# Patient Record
Sex: MALE
[De-identification: names, ages, dates, MRNs, and addresses within clinical notes are randomized per-mention and may not be internally consistent; named-entity substitution may affect disease eponyms.]

---

## 2018-07-30 PROBLEM — Z00.00 ENCOUNTER FOR PREVENTIVE HEALTH EXAMINATION: Status: ACTIVE | Noted: 2018-07-30

## 2018-08-13 ENCOUNTER — APPOINTMENT (OUTPATIENT)
Dept: BARIATRICS | Facility: CLINIC | Age: 43
End: 2018-08-13
Payer: COMMERCIAL

## 2018-08-13 VITALS
DIASTOLIC BLOOD PRESSURE: 82 MMHG | HEIGHT: 68 IN | WEIGHT: 315 LBS | HEART RATE: 91 BPM | SYSTOLIC BLOOD PRESSURE: 136 MMHG | OXYGEN SATURATION: 91 % | BODY MASS INDEX: 47.74 KG/M2

## 2018-08-13 DIAGNOSIS — Z83.49 FAMILY HISTORY OF OTHER ENDOCRINE, NUTRITIONAL AND METABOLIC DISEASES: ICD-10-CM

## 2018-08-13 DIAGNOSIS — Z83.3 FAMILY HISTORY OF DIABETES MELLITUS: ICD-10-CM

## 2018-08-13 DIAGNOSIS — Z01.818 ENCOUNTER FOR OTHER PREPROCEDURAL EXAMINATION: ICD-10-CM

## 2018-08-13 DIAGNOSIS — Z86.79 PERSONAL HISTORY OF OTHER DISEASES OF THE CIRCULATORY SYSTEM: ICD-10-CM

## 2018-08-13 PROCEDURE — 99244 OFF/OP CNSLTJ NEW/EST MOD 40: CPT

## 2018-08-14 PROBLEM — Z83.3 FAMILY HISTORY OF DIABETES MELLITUS: Status: ACTIVE | Noted: 2018-08-13

## 2018-08-14 PROBLEM — Z83.49 FAMILY HISTORY OF OBESITY: Status: ACTIVE | Noted: 2018-08-13

## 2018-08-14 PROBLEM — Z86.79 HISTORY OF HYPERTENSION: Status: RESOLVED | Noted: 2018-08-13 | Resolved: 2018-08-14

## 2018-08-14 RX ORDER — CETIRIZINE HYDROCHLORIDE 10 MG/1
10 TABLET, FILM COATED ORAL
Refills: 0 | Status: ACTIVE | COMMUNITY

## 2018-08-14 RX ORDER — MOMETASONE FUROATE AND FORMOTEROL FUMARATE DIHYDRATE 50; 5 UG/1; UG/1
AEROSOL RESPIRATORY (INHALATION)
Refills: 0 | Status: ACTIVE | COMMUNITY

## 2018-08-14 RX ORDER — UBIQUINOL 100 MG
CAPSULE ORAL
Refills: 0 | Status: ACTIVE | COMMUNITY

## 2018-08-14 RX ORDER — BACILLUS COAGULANS/INULIN 1B-250 MG
CAPSULE ORAL
Refills: 0 | Status: ACTIVE | COMMUNITY

## 2018-11-05 ENCOUNTER — APPOINTMENT (OUTPATIENT)
Dept: BARIATRICS | Facility: CLINIC | Age: 43
End: 2018-11-05
Payer: COMMERCIAL

## 2018-11-05 ENCOUNTER — APPOINTMENT (OUTPATIENT)
Dept: BARIATRICS/WEIGHT MGMT | Facility: CLINIC | Age: 43
End: 2018-11-05
Payer: COMMERCIAL

## 2018-11-05 VITALS
BODY MASS INDEX: 47.74 KG/M2 | SYSTOLIC BLOOD PRESSURE: 130 MMHG | HEART RATE: 89 BPM | WEIGHT: 315 LBS | OXYGEN SATURATION: 94 % | HEIGHT: 68 IN | DIASTOLIC BLOOD PRESSURE: 80 MMHG

## 2018-11-05 VITALS — WEIGHT: 315 LBS | HEIGHT: 68 IN | BODY MASS INDEX: 47.74 KG/M2

## 2018-11-05 DIAGNOSIS — Z78.9 OTHER SPECIFIED HEALTH STATUS: ICD-10-CM

## 2018-11-05 DIAGNOSIS — J44.9 CHRONIC OBSTRUCTIVE PULMONARY DISEASE, UNSPECIFIED: ICD-10-CM

## 2018-11-05 DIAGNOSIS — F32.9 MAJOR DEPRESSIVE DISORDER, SINGLE EPISODE, UNSPECIFIED: ICD-10-CM

## 2018-11-05 DIAGNOSIS — R63.5 ABNORMAL WEIGHT GAIN: ICD-10-CM

## 2018-11-05 PROCEDURE — 90791 PSYCH DIAGNOSTIC EVALUATION: CPT

## 2018-11-05 PROCEDURE — 97802 MEDICAL NUTRITION INDIV IN: CPT

## 2018-11-05 PROCEDURE — 99214 OFFICE O/P EST MOD 30 MIN: CPT

## 2018-11-06 PROBLEM — R63.5 WEIGHT GAIN: Status: ACTIVE | Noted: 2018-08-13

## 2019-01-03 ENCOUNTER — APPOINTMENT (OUTPATIENT)
Dept: BARIATRICS | Facility: CLINIC | Age: 44
End: 2019-01-03
Payer: COMMERCIAL

## 2019-01-03 ENCOUNTER — APPOINTMENT (OUTPATIENT)
Dept: BARIATRICS/WEIGHT MGMT | Facility: CLINIC | Age: 44
End: 2019-01-03
Payer: COMMERCIAL

## 2019-01-03 VITALS
HEART RATE: 84 BPM | DIASTOLIC BLOOD PRESSURE: 80 MMHG | SYSTOLIC BLOOD PRESSURE: 124 MMHG | BODY MASS INDEX: 47.74 KG/M2 | OXYGEN SATURATION: 96 % | WEIGHT: 315 LBS | HEIGHT: 68 IN

## 2019-01-03 VITALS — WEIGHT: 315 LBS | BODY MASS INDEX: 58.19 KG/M2

## 2019-01-03 DIAGNOSIS — R73.03 PREDIABETES.: ICD-10-CM

## 2019-01-03 PROCEDURE — 99214 OFFICE O/P EST MOD 30 MIN: CPT

## 2019-01-03 PROCEDURE — 90834 PSYTX W PT 45 MINUTES: CPT

## 2019-01-30 ENCOUNTER — APPOINTMENT (OUTPATIENT)
Dept: BARIATRICS/WEIGHT MGMT | Facility: CLINIC | Age: 44
End: 2019-01-30
Payer: COMMERCIAL

## 2019-01-30 VITALS — HEIGHT: 68 IN | WEIGHT: 315 LBS | BODY MASS INDEX: 47.74 KG/M2

## 2019-01-30 PROCEDURE — 97803 MED NUTRITION INDIV SUBSEQ: CPT

## 2019-02-04 ENCOUNTER — APPOINTMENT (OUTPATIENT)
Dept: BARIATRICS/WEIGHT MGMT | Facility: CLINIC | Age: 44
End: 2019-02-04
Payer: COMMERCIAL

## 2019-02-04 ENCOUNTER — APPOINTMENT (OUTPATIENT)
Dept: BARIATRICS | Facility: CLINIC | Age: 44
End: 2019-02-04
Payer: COMMERCIAL

## 2019-02-04 VITALS
OXYGEN SATURATION: 94 % | WEIGHT: 315 LBS | SYSTOLIC BLOOD PRESSURE: 140 MMHG | BODY MASS INDEX: 47.74 KG/M2 | HEART RATE: 84 BPM | DIASTOLIC BLOOD PRESSURE: 100 MMHG | HEIGHT: 68 IN

## 2019-02-04 DIAGNOSIS — G47.33 OBSTRUCTIVE SLEEP APNEA (ADULT) (PEDIATRIC): ICD-10-CM

## 2019-02-04 PROCEDURE — 90832 PSYTX W PT 30 MINUTES: CPT

## 2019-02-04 PROCEDURE — 99214 OFFICE O/P EST MOD 30 MIN: CPT

## 2019-02-04 NOTE — REASON FOR VISIT
[Follow-Up Visit] : a follow-up visit for [Morbid Obesity (BMI>40)] : morbid obesity (bmi>40) [FreeTextEntry2] : WEIGH IN VISIT.

## 2019-02-04 NOTE — ASSESSMENT
[FreeTextEntry1] : 43 year old M undergoing workup for laparoscopic sleeve gastrectomy here for WEIGH IN VISIT. Lost 6 lbs since last visit. Patient is currently in the process of completing his workup as well as a medically supervised diet\par \par Will schedule surgery once workup is complete.DISCUSSED AND REVIEWED FURTHER REQUIREMENTS NEEDED PRIOR TO SCHEDULING SURGERY. \par \par Appointments and testing have bee scheduled. Visit with Dr. Kishore valerio today. Will need weigh in visits. Pt not sure whether he wants to proceed with surgery due to ongoing weight loss since consult however has not decided yet.\par \par Appointments and testing have bee scheduled. Scheduled visit with Dr. Kishore valerio today\par \par Nutritional counseling has been provided. The patient is encouraged to remain calorie conscious and continue a low fat, low carbohydrate, protein focus diet. Pt encouraged to participate in a daily exercise regimen incorporating cardio and strength training. \par \par Return to office in 4 weeks for next weigh in visit.

## 2019-02-04 NOTE — PHYSICAL EXAM
[Obese, well nourished, in no acute distress] : obese, well nourished, in no acute distress [Normal] : affect appropriate [de-identified] : normoactive bowel sounds, soft and non tender, no hepatosplenomegaly or masses appreciated.

## 2019-02-04 NOTE — REVIEW OF SYSTEMS
[Recent Change In Weight] : ~T recent weight change [Negative] : Endocrine [Fever] : no fever [Chills] : no chills [Dysphagia] : no dysphagia [Chest Pain] : no chest pain [Palpitations] : no palpitations [Lower Ext Edema] : no lower extremity edema [Shortness Of Breath] : no shortness of breath [Wheezing] : no wheezing [SOB on Exertion] : no shortness of breath during exertion [Abdominal Pain] : no abdominal pain [Vomiting] : no vomiting [Constipation] : no constipation [Diarrhea] : no diarrhea [Reflux/Heartburn] : no reflex/heartburn [Hernia] : no hernia [FreeTextEntry2] : weight loss

## 2019-02-04 NOTE — HISTORY OF PRESENT ILLNESS
[de-identified] : 43 year old M undergoing workup for laparoscopic sleeve gastrectomy here for WEIGH IN VISIT. Lost 6 lbs since last visit. Patient is currently in the process of completing his workup as well as a medically supervised diet. History of binge eating pattern/ snacking / grazing during the day. Pt states he has not been snacking or binge eating since last visit and continues to work on portion control.Patient continues to make efforts to improve food choices and increased activity.Pt is working on following a protein focused diet - 3 meals a day - consuming a sufficient amount of zero calorie liquid.  Patient knows he needs to lose weight prior to surgery. Plan to increase daily exercise - incorporating cardio and strength training. .All questions were answered.FURTHER REQUIREMENTS PRIOR TO SCHEDULING SURGERY : Appointments and testing have bee scheduled. Pt  saw Dr. Novak - psych today. Will need additional weigh in visits. Pt is still indecisive about whether he wants to proceed with surgery due to ongoing weight loss since consult however has not decided yet.Will continue to follow up with nutritionist / psych / Dr. Yousif. \par

## 2019-03-08 ENCOUNTER — APPOINTMENT (OUTPATIENT)
Dept: BARIATRICS | Facility: CLINIC | Age: 44
End: 2019-03-08
Payer: COMMERCIAL

## 2019-03-08 VITALS
BODY MASS INDEX: 47.74 KG/M2 | HEART RATE: 79 BPM | WEIGHT: 315 LBS | DIASTOLIC BLOOD PRESSURE: 82 MMHG | SYSTOLIC BLOOD PRESSURE: 140 MMHG | OXYGEN SATURATION: 96 % | HEIGHT: 68 IN

## 2019-03-08 DIAGNOSIS — I10 ESSENTIAL (PRIMARY) HYPERTENSION: ICD-10-CM

## 2019-03-08 DIAGNOSIS — E66.9 OBESITY, UNSPECIFIED: ICD-10-CM

## 2019-03-08 DIAGNOSIS — R63.4 ABNORMAL WEIGHT LOSS: ICD-10-CM

## 2019-03-08 PROCEDURE — 99214 OFFICE O/P EST MOD 30 MIN: CPT

## 2019-03-11 NOTE — PHYSICAL EXAM
[Obese, well nourished, in no acute distress] : obese, well nourished, in no acute distress [Normal] : affect appropriate [de-identified] : normoactive bowel sounds, soft and non tender, no hepatosplenomegaly or masses appreciated.

## 2019-03-11 NOTE — HISTORY OF PRESENT ILLNESS
[de-identified] : 43 year old M undergoing workup for laparoscopic sleeve gastrectomy here for PRE OP VISIT. Weight loss since last visit. History of binge eating pattern/ snacking / grazing during the day. Pt states he has not been snacking or binge eating since last visit and continues to work on portion control.Patient continues to make efforts to improve food choices and increased activity.Pt is working on following a protein focused diet - 3 meals a day - consuming a sufficient amount of zero calorie liquid. Plan to increase daily exercise - incorporating cardio and strength training. .All questions were answered:  Pt  saw Dr. Novak - psych last month. . Pt has decided to NOT proceed with WEIGHT LOSS SURGERY at this time . Pt indicated that he wants to continue to work on losing weight on his own. \par

## 2019-03-11 NOTE — ASSESSMENT
[FreeTextEntry1] : 43 year old M undergoing workup for laparoscopic sleeve gastrectomy here for PRE OP VISIT. Lost 6 lbs since last visit. Patient is currently in the process of completing his workup as well as a medically supervised diet\par \par Pt has decided to NOT proceed with WEIGHT LOSS SURGERY at this time . Pt indicated that he wants to continue to work on losing weight on his own. \par  \par Nutritional counseling has been provided. The patient is encouraged to remain calorie conscious and continue a low fat, low carbohydrate, protein focus diet. Pt encouraged to participate in a daily exercise regimen incorporating cardio and strength training. \par \par Return to office as needed .

## 2019-03-20 ENCOUNTER — APPOINTMENT (OUTPATIENT)
Dept: BARIATRICS/WEIGHT MGMT | Facility: CLINIC | Age: 44
End: 2019-03-20

## 2019-04-08 ENCOUNTER — APPOINTMENT (OUTPATIENT)
Dept: BARIATRICS | Facility: CLINIC | Age: 44
End: 2019-04-08

## 2019-05-23 ENCOUNTER — APPOINTMENT (OUTPATIENT)
Dept: BARIATRICS/WEIGHT MGMT | Facility: CLINIC | Age: 44
End: 2019-05-23

## 2024-05-03 ENCOUNTER — LABORATORY RESULT (OUTPATIENT)
Age: 49
End: 2024-05-03